# Patient Record
Sex: FEMALE | Race: WHITE | Employment: FULL TIME | URBAN - METROPOLITAN AREA
[De-identification: names, ages, dates, MRNs, and addresses within clinical notes are randomized per-mention and may not be internally consistent; named-entity substitution may affect disease eponyms.]

---

## 2019-01-25 PROCEDURE — 88305 TISSUE EXAM BY PATHOLOGIST: CPT | Performed by: INTERNAL MEDICINE

## 2020-11-28 ENCOUNTER — HOSPITAL ENCOUNTER (OUTPATIENT)
Age: 65
Discharge: HOME | End: 2020-11-28
Attending: EMERGENCY MEDICINE
Payer: COMMERCIAL

## 2020-11-28 VITALS
DIASTOLIC BLOOD PRESSURE: 87 MMHG | WEIGHT: 136 LBS | TEMPERATURE: 99 F | HEART RATE: 97 BPM | HEIGHT: 67 IN | SYSTOLIC BLOOD PRESSURE: 144 MMHG | RESPIRATION RATE: 18 BRPM | OXYGEN SATURATION: 97 % | BODY MASS INDEX: 21.35 KG/M2

## 2020-11-28 DIAGNOSIS — R11.0 NAUSEA: Primary | ICD-10-CM

## 2020-11-28 PROCEDURE — 85025 COMPLETE CBC W/AUTO DIFF WBC: CPT | Performed by: EMERGENCY MEDICINE

## 2020-11-28 PROCEDURE — 93005 ELECTROCARDIOGRAM TRACING: CPT

## 2020-11-28 PROCEDURE — 99204 OFFICE O/P NEW MOD 45 MIN: CPT

## 2020-11-28 PROCEDURE — 84484 ASSAY OF TROPONIN QUANT: CPT

## 2020-11-28 PROCEDURE — 36415 COLL VENOUS BLD VENIPUNCTURE: CPT

## 2020-11-28 PROCEDURE — 93010 ELECTROCARDIOGRAM REPORT: CPT

## 2020-11-28 PROCEDURE — 93010 ELECTROCARDIOGRAM REPORT: CPT | Performed by: EMERGENCY MEDICINE

## 2020-11-28 PROCEDURE — 80047 BASIC METABLC PNL IONIZED CA: CPT

## 2020-11-28 RX ORDER — ONDANSETRON 4 MG/1
4 TABLET, ORALLY DISINTEGRATING ORAL EVERY 6 HOURS PRN
Qty: 10 TABLET | Refills: 0 | Status: SHIPPED | OUTPATIENT
Start: 2020-11-28 | End: 2020-12-05

## 2020-11-28 NOTE — ED INITIAL ASSESSMENT (HPI)
Pt c/o abdominal pain (epigastric) intermittently x 1 week. Has some relief when eating. Recently in [facility] for 5 nights. Symptoms include nausea and loose stools on Monday.

## 2020-11-28 NOTE — ED PROVIDER NOTES
Patient Seen in: [facility]      History   Patient presents with:  Abdomen/Flank Pain    Stated Complaint: stomach ache     HPI    73 yo female with one week of intermittent epigastric discomfort/nausea. No vomiting.  Stool has been looser kevin regular rhythm. Pulmonary:      Effort: Pulmonary effort is normal. No respiratory distress. Abdominal:      General: Abdomen is flat. Palpations: Abdomen is soft. Tenderness: There is no abdominal tenderness.    Skin:     General: Skin is war

## 2022-01-20 ENCOUNTER — LAB SERVICES (OUTPATIENT)
Dept: URGENT CARE | Age: 67
End: 2022-01-20

## 2022-01-20 DIAGNOSIS — Z20.822 COVID-19 RULED OUT BY LABORATORY TESTING: Primary | ICD-10-CM

## 2022-01-20 PROCEDURE — U0005 INFEC AGEN DETEC AMPLI PROBE: HCPCS | Performed by: CLINICAL MEDICAL LABORATORY

## 2022-01-20 PROCEDURE — U0003 INFECTIOUS AGENT DETECTION BY NUCLEIC ACID (DNA OR RNA); SEVERE ACUTE RESPIRATORY SYNDROME CORONAVIRUS 2 (SARS-COV-2) (CORONAVIRUS DISEASE [COVID-19]), AMPLIFIED PROBE TECHNIQUE, MAKING USE OF HIGH THROUGHPUT TECHNOLOGIES AS DESCRIBED BY CMS-2020-01-R: HCPCS | Performed by: CLINICAL MEDICAL LABORATORY

## 2022-01-21 LAB
SARS-COV-2 RNA RESP QL NAA+PROBE: NOT DETECTED
SERVICE CMNT-IMP: NORMAL
SERVICE CMNT-IMP: NORMAL

## 2022-01-24 ENCOUNTER — TELEPHONE (OUTPATIENT)
Dept: SCHEDULING | Age: 67
End: 2022-01-24

## 2022-01-24 RX ORDER — AZITHROMYCIN 250 MG/1
TABLET, FILM COATED ORAL
Qty: 6 TABLET | Refills: 0 | Status: SHIPPED | OUTPATIENT
Start: 2022-01-24 | End: 2022-02-27 | Stop reason: ALTCHOICE

## 2022-02-26 ENCOUNTER — TELEPHONE (OUTPATIENT)
Dept: SCHEDULING | Age: 67
End: 2022-02-26

## 2022-02-27 ENCOUNTER — OFFICE VISIT (OUTPATIENT)
Dept: URGENT CARE | Age: 67
End: 2022-02-27

## 2022-02-27 VITALS — RESPIRATION RATE: 18 BRPM | WEIGHT: 135 LBS | TEMPERATURE: 98.3 F

## 2022-02-27 DIAGNOSIS — J20.9 ACUTE BRONCHITIS, UNSPECIFIED ORGANISM: Primary | ICD-10-CM

## 2022-02-27 DIAGNOSIS — Z20.822 COVID-19 RULED OUT BY LABORATORY TESTING: ICD-10-CM

## 2022-02-27 PROCEDURE — U0003 INFECTIOUS AGENT DETECTION BY NUCLEIC ACID (DNA OR RNA); SEVERE ACUTE RESPIRATORY SYNDROME CORONAVIRUS 2 (SARS-COV-2) (CORONAVIRUS DISEASE [COVID-19]), AMPLIFIED PROBE TECHNIQUE, MAKING USE OF HIGH THROUGHPUT TECHNOLOGIES AS DESCRIBED BY CMS-2020-01-R: HCPCS | Performed by: CLINICAL MEDICAL LABORATORY

## 2022-02-27 PROCEDURE — 99214 OFFICE O/P EST MOD 30 MIN: CPT | Performed by: NURSE PRACTITIONER

## 2022-02-27 PROCEDURE — U0005 INFEC AGEN DETEC AMPLI PROBE: HCPCS | Performed by: CLINICAL MEDICAL LABORATORY

## 2022-02-27 RX ORDER — LEVOFLOXACIN 500 MG/1
500 TABLET, FILM COATED ORAL DAILY
Qty: 10 TABLET | Refills: 0 | Status: SHIPPED | OUTPATIENT
Start: 2022-02-27 | End: 2022-03-09

## 2022-02-27 ASSESSMENT — ENCOUNTER SYMPTOMS
HEMATOLOGIC/LYMPHATIC NEGATIVE: 1
EYES NEGATIVE: 1
COUGH: 1
GASTROINTESTINAL NEGATIVE: 1
SHORTNESS OF BREATH: 0
NEUROLOGICAL NEGATIVE: 1
WHEEZING: 0
CONSTITUTIONAL NEGATIVE: 1
PSYCHIATRIC NEGATIVE: 1
ALLERGIC/IMMUNOLOGIC NEGATIVE: 1

## 2022-02-28 LAB
SARS-COV-2 RNA RESP QL NAA+PROBE: DETECTED
SERVICE CMNT-IMP: ABNORMAL

## 2022-03-01 ENCOUNTER — TELEPHONE (OUTPATIENT)
Dept: URGENT CARE | Age: 67
End: 2022-03-01

## 2022-03-11 ENCOUNTER — TELEPHONE (OUTPATIENT)
Dept: SCHEDULING | Age: 67
End: 2022-03-11

## 2022-06-20 ENCOUNTER — TELEPHONE (OUTPATIENT)
Dept: SCHEDULING | Age: 67
End: 2022-06-20

## 2022-08-19 ENCOUNTER — NURSE TRIAGE (OUTPATIENT)
Dept: TELEHEALTH | Age: 67
End: 2022-08-19

## 2022-11-02 ENCOUNTER — LAB REQUISITION (OUTPATIENT)
Dept: LAB | Facility: HOSPITAL | Age: 67
End: 2022-11-02

## 2022-11-02 DIAGNOSIS — M67.871 OTHER SPECIFIED DISORDERS OF SYNOVIUM, RIGHT ANKLE AND FOOT: ICD-10-CM

## 2022-11-02 PROCEDURE — 88305 TISSUE EXAM BY PATHOLOGIST: CPT | Performed by: PODIATRIST

## 2023-03-22 ENCOUNTER — LAB REQUISITION (OUTPATIENT)
Dept: LAB | Facility: HOSPITAL | Age: 68
End: 2023-03-22
Payer: MEDICARE

## 2023-03-22 DIAGNOSIS — Z01.89 ENCOUNTER FOR OTHER SPECIFIED SPECIAL EXAMINATIONS: ICD-10-CM

## 2023-03-22 PROCEDURE — 88304 TISSUE EXAM BY PATHOLOGIST: CPT | Performed by: PODIATRIST
